# Patient Record
Sex: FEMALE
[De-identification: names, ages, dates, MRNs, and addresses within clinical notes are randomized per-mention and may not be internally consistent; named-entity substitution may affect disease eponyms.]

---

## 2022-04-14 ENCOUNTER — APPOINTMENT (OUTPATIENT)
Dept: ORTHOPEDIC SURGERY | Facility: CLINIC | Age: 72
End: 2022-04-14

## 2022-04-14 PROBLEM — Z00.00 ENCOUNTER FOR PREVENTIVE HEALTH EXAMINATION: Status: ACTIVE | Noted: 2022-04-14

## 2022-09-14 ENCOUNTER — APPOINTMENT (OUTPATIENT)
Dept: ORTHOPEDIC SURGERY | Facility: CLINIC | Age: 72
End: 2022-09-14

## 2022-09-14 ENCOUNTER — NON-APPOINTMENT (OUTPATIENT)
Age: 72
End: 2022-09-14

## 2022-09-14 PROCEDURE — 99204 OFFICE O/P NEW MOD 45 MIN: CPT

## 2022-09-14 NOTE — DISCUSSION/SUMMARY
[de-identified] : Today I had a lengthy discussion with the patient regarding their left foot hallux valgus. I have addressed all the patient's concerns surrounding the pathology of their condition. XR imaging results were reviewed with the patient. In order to provide the patient with a better understanding of their ailment, I educated them about the anatomy, physiology and lifespan of their condition using a foot model. We discussed both operative and non-operative treatment options. A discussion was had about shoe-wear modifications. I advised the patient to utilize a wide toed cross training sneaker that better accommodates the feet. I recommended [employer] Balance, [place], [employer], [employer]ics, or [employer] to the patient.  The patient understood and verbally agreed to the treatment plan. All of their questions were answered and they were satisfied with the visit. The patient should call the office if they have any questions or experience worsening symptoms. I would like to see the patient back in the office in 3-6 months to reassess their condition. 				\par

## 2022-09-14 NOTE — HISTORY OF PRESENT ILLNESS
[FreeTextEntry1] : The patient is a 72 year old female presenting with her spouse for an initial evaluation of her left foot bunion. She presents today to discuss interventions in regards to the bunion present on her left foot. She confirms history of hereditary bunions. The patient describes acute pain localized to the bunion without any trauma. She denies any significant pain in the office today. She does report increased pain with shoe wear. The patient presents wearing sandals and is walking without assistance. She was seen at [facility] for this and was recommended MIS surgery. She presents today for a second opinion. History of TKR. No other complaints. \par

## 2022-09-14 NOTE — PHYSICAL EXAM
[de-identified] : General: Alert and oriented x3. In no acute distress. Pleasant in nature with a normal affect. No apparent respiratory distress.\par \par Left Foot Exam\par Skin: Clean, dry, intact\par Inspection: Hallux Valgus. Stiff hammertoe second toe. No obvious malalignment, no masses, no swelling, no effusion\par Pulses: 2+ DP/PT pulses\par ROM: FOOT Full  ROM of digits, ANKLE 10 degrees of dorsiflexion, 40 degrees of plantarflexion, 10 degrees of subtalar motion.\par Painful ROM: None\par Tenderness: No tenderness over the medial malleolus, No tenderness over the lateral malleolus, no CFL/ATFL/PTFL pain, no deltoid ligament pain. No heel pain. No Achilles tenderness. No 5th metatarsal pain. No pain to the LisFranc joint. No ttp over the posterior tibial tendon.\par Stability: Negative anterior/posterior drawer.\par Strength: 5/5 ADD/ABD/TA/GS/EHL/FHL/EDL\par Neuro: Sensation in tact to light touch throughout\par Additional tests: Negative Mortons test, negative tarsal tunnel tinels, negative single heel rise.	 [de-identified] : Xrays of the left foot obtained from outside facility on 7/18/2022 and reviewed in the office today, 09/14/2022 , revealed: Hallux valgus. Hammertoe to the second toe. The report was unavailable.

## 2022-09-14 NOTE — ADDENDUM
[FreeTextEntry1] : I, Sara Stone, acted solely as a scribe for Dr. Thomas Sauer on this date 09/14/2022.\par \par All medical record entries made by the Scribe were at my, Dr. Thomas Sauer, direction and personally dictated by me on 09/14/2022. I have reviewed the chart and agree that the record accurately reflects my personal performance of the history, physical exam, assessment and plan. I have also personally directed, reviewed, and agreed with the chart.	\par

## 2023-04-19 ENCOUNTER — APPOINTMENT (OUTPATIENT)
Dept: ORTHOPEDIC SURGERY | Facility: CLINIC | Age: 73
End: 2023-04-19
Payer: MEDICARE

## 2023-04-19 DIAGNOSIS — M21.612 BUNION OF LEFT FOOT: ICD-10-CM

## 2023-04-19 DIAGNOSIS — M20.42 OTHER HAMMER TOE(S) (ACQUIRED), LEFT FOOT: ICD-10-CM

## 2023-04-19 PROCEDURE — 99213 OFFICE O/P EST LOW 20 MIN: CPT

## 2023-04-19 NOTE — ADDENDUM
[FreeTextEntry1] : I, MER ABRAHAM, acted solely as a scribe for Dr. Thomas Sauer on this date 04/19/2023  .\par  \par All medical record entries made by the Scribe were at my, Dr. Thomas Sauer, direction and personally dictated by me on 04/19/2023 . I have reviewed the chart and agree that the record accurately reflects my personal performance of the history, physical exam, assessment and plan. I have also personally directed, reviewed, and agreed with the chart.

## 2023-04-19 NOTE — DISCUSSION/SUMMARY
[de-identified] : Today I had a lengthy discussion with the patient regarding their left foot hallux valgus. I have addressed all the patient's concerns surrounding the pathology of their condition. I advised the patient to continue to utilize a wide toed cross training sneaker that better accommodates the feet. I recommend that the patient utilize a toe spacer. The patient was provided with a toe spacer in the office today.   The patient understood and verbally agreed to the treatment plan. All of their questions were answered and they were satisfied with the visit. The patient should call the office if they have any questions or experience worsening symptoms.\par \par Follow up prn.

## 2023-04-19 NOTE — HISTORY OF PRESENT ILLNESS
[FreeTextEntry1] : 4/19/23: The patient is a 73-year-old female who presents for follow-up evaluation of her left foot bunion who would like to continue with her conservative treatment plan. She has had no pain and is doing well. She presents to the office today in loafers and ambulating without assistance. \par \par 9/14/23: The patient is a 72 year old female presenting with her spouse for an initial evaluation of her left foot bunion. She presents today to discuss interventions in regards to the bunion present on her left foot. She confirms history of hereditary bunions. The patient describes acute pain localized to the bunion without any trauma. She denies any significant pain in the office today. She does report increased pain with shoe wear. The patient presents wearing sandals and is walking without assistance. She was seen at [facility] for this and was recommended MIS surgery. She presents today for a second opinion. History of TKR. No other complaints. \par

## 2023-04-19 NOTE — PHYSICAL EXAM
[de-identified] : General: Alert and oriented x3. In no acute distress. Pleasant in nature with a normal affect. No apparent respiratory distress.\par \par Left Foot Exam\par Skin: Clean, dry, intact\par Inspection: Hallux Valgus. Stiff hammertoe second toe. No obvious malalignment, no masses, no swelling, no effusion\par Pulses: 2+ DP/PT pulses\par ROM: FOOT Full  ROM of digits, ANKLE 10 degrees of dorsiflexion, 40 degrees of plantarflexion, 10 degrees of subtalar motion.\par Painful ROM: None\par Tenderness: No tenderness over the medial malleolus, No tenderness over the lateral malleolus, no CFL/ATFL/PTFL pain, no deltoid ligament pain. No heel pain. No Achilles tenderness. No 5th metatarsal pain. No pain to the LisFranc joint. No ttp over the posterior tibial tendon.\par Stability: Negative anterior/posterior drawer.\par Strength: 5/5 ADD/ABD/TA/GS/EHL/FHL/EDL\par Neuro: Sensation in tact to light touch throughout\par Additional tests: Negative Mortons test, negative tarsal tunnel tinels, negative single heel rise.	 [de-identified] : Previous xrays of the left foot obtained from outside facility on 7/18/2022 and reviewed in the office today, 09/14/2022 , revealed: Hallux valgus. Hammertoe to the second toe.

## 2024-08-02 ENCOUNTER — APPOINTMENT (OUTPATIENT)
Dept: ELECTROPHYSIOLOGY | Facility: CLINIC | Age: 74
End: 2024-08-02